# Patient Record
Sex: FEMALE | Race: ASIAN | Employment: UNEMPLOYED | URBAN - METROPOLITAN AREA
[De-identification: names, ages, dates, MRNs, and addresses within clinical notes are randomized per-mention and may not be internally consistent; named-entity substitution may affect disease eponyms.]

---

## 2024-09-11 NOTE — PROGRESS NOTES
Outpatient Maternal-Fetal Medicine Consultation    Dear Dr. Crawley    Thank you for requesting ultrasound evaluation and maternal fetal medicine consultation on your patient Carol Dias.  As you are aware she is a 28 year old female  with a singletonpregnancy and an Estimated Date of Delivery: 25..  A maternal-fetal medicine consultation was requested secondary to  FHx cleft lip/palate .  Her prenatal records and labs were reviewed.    HISTORY  # 1 - Date: None, Sex: None, Weight: None, GA: None, Type: None, Apgar1: None, Apgar5: None, Living: None, Birth Comments: None    Past Medical History  The patient  has no past medical history on file.    Past Surgical History  The patient  has no past surgical history on file.    Family History  The patient She indicated that her mother is alive. She indicated that her father is alive. She indicated that both of her sisters are alive. She indicated that her maternal grandmother is . She indicated that her maternal grandfather is . She indicated that her paternal grandmother is . She indicated that her paternal grandfather is . She indicated that her paternal aunt is . She indicated that her paternal uncle is alive.    Medications:   Current Outpatient Medications:     Prenatal Vit w/Qc-Kkcjucugn-WN (PNV OR), Take by mouth daily., Disp: , Rfl:     SRONYX 0.1-20 MG-MCG Oral Tab, TAKE 1 TABLET BY MOUTH EVERY DAY, Disp: 84 tablet, Rfl: 0    ciprofloxacin (CIPRO) 250 MG Oral Tab, Take 1 tablet (250 mg total) by mouth 2 (two) times daily. (Patient not taking: No sig reported), Disp: 6 tablet, Rfl: 0  Allergies: No Known Allergies    PHYSICAL EXAMINATION:  /87 (BP Location: Right arm, Patient Position: Sitting, Cuff Size: adult)   Pulse 77   Ht 5' 4\" (1.626 m)   Wt 168 lb (76.2 kg)   BMI 28.84 kg/m²   General: alert and oriented,no acute distress  Abdomen: gravid, soft, non-tender  Extremities: non-tender, no  edema    OBSTETRIC ULTRASOUND  The patient had a level II ultrasound today which revealed size consistent with dates and a normal detailed anatomic survey.      Ultrasound Findings:  Single IUP in cephalic presentation.    Placenta is posterior.   A 3 vessel cord, 3 vessel cord is noted.  Cardiac activity is present at 149 bpm   g ( 0 lb 12 oz);   MVP is 5.2 cm .     The fetal measurements are consistent with the established EDC. No ultrasound evidence of structural abnormalities are seen today. The nasal bone is present. No ultrasound evidence of markers for aneuploidy are seen. She understands that ultrasound exam cannot exclude genetic abnormalities and that genetic testing is recommended. The limitations of ultrasound were discussed.     Uterus and adnexa appeared normal  today on US    See PACS/Imaging Tab For Complete Ultrasound Report  I interpreted the results and reviewed them with the patient.    DISCUSSION  During her visit we discussed and reviewed the following issues:  FAMILY HISTORY CLEFT LIP  Sister-in-law with cleft lip/palate.    The incidence of facial clefts is 1:1000.  They occur twice as often in males.  Risk factors include AMA, anticonvulsants, pesticides, retinoids, alcohol and vitamin deficiencies.  Approximately 25% of clefts are associated with other anomalies and then chromosomal abnormalities may be as high as 35%; hence genetic testing is offered.  Isolated unilateral cleft lip is not associated with genetic abnormalities. Other syndromes (not associated with aneuploidy) are also seen with clefting; these may not be amenable to prenatal diagnosis although prenatal  microarray testing may improve the yield.  The immediate concerns after delivery are  airway management and feeding.  Prenatal consultation with neonatology, speech therapy and pediatric plastic surgeons is advised.  The empiric risk of recurrence is 4% but if related to an underlying  Syndrome the recurrence  risk may be elevated.     IMPRESSION:  IUP at 20w0d  Family History Cleft Lip/Palate: reassuring level II     RECOMMENDATIONS:  Continue care with Dr. Crawley  Routine antepartum care    Thank you for allowing me to participate in the care of your patient.  Please do not hesitate to contact me if additional questions or concerns arise.      Haim Silver M.D.    40 minutes spent in review of records, patient consultation, documentation and coordination of care.  The relevant clinical matter(s) are summarized above.     Note to patient and family  The 21st Century Cures Act makes medical notes available to patients in the interest of transparency.  However, please be advised that this is a medical document.  It is intended as axeo-yy-jvuk communication.  It is written and medical language may contain abbreviations or verbiage that are technical and unfamiliar.  It may appear blunt or direct.  Medical documents are intended to carry relevant information, facts as evident, and the clinical opinion of the practitioner.

## 2024-09-13 ENCOUNTER — OFFICE VISIT (OUTPATIENT)
Dept: PERINATAL CARE | Facility: HOSPITAL | Age: 29
End: 2024-09-13
Attending: OBSTETRICS & GYNECOLOGY
Payer: COMMERCIAL

## 2024-09-13 VITALS
WEIGHT: 168 LBS | SYSTOLIC BLOOD PRESSURE: 129 MMHG | BODY MASS INDEX: 28.68 KG/M2 | HEIGHT: 64 IN | DIASTOLIC BLOOD PRESSURE: 87 MMHG | HEART RATE: 77 BPM

## 2024-09-13 DIAGNOSIS — Z82.79 FAMILY HISTORY OF CLEFT LIP: ICD-10-CM

## 2024-09-13 DIAGNOSIS — Z82.79 FAMILY HISTORY OF CLEFT LIP: Primary | ICD-10-CM

## 2024-09-13 PROCEDURE — 76811 OB US DETAILED SNGL FETUS: CPT | Performed by: OBSTETRICS & GYNECOLOGY

## 2024-11-27 ENCOUNTER — HOSPITAL ENCOUNTER (OUTPATIENT)
Facility: HOSPITAL | Age: 29
Discharge: HOME | End: 2024-11-27
Attending: OBSTETRICS & GYNECOLOGY | Admitting: OBSTETRICS & GYNECOLOGY
Payer: COMMERCIAL

## 2024-11-27 VITALS — HEART RATE: 80 BPM | SYSTOLIC BLOOD PRESSURE: 119 MMHG | DIASTOLIC BLOOD PRESSURE: 74 MMHG

## 2024-11-27 PROCEDURE — 99213 OFFICE O/P EST LOW 20 MIN: CPT

## 2024-11-27 PROCEDURE — 59025 FETAL NON-STRESS TEST: CPT

## 2024-11-27 NOTE — PROGRESS NOTES
Updated  on NST reactive and BP WNL.  + fetal movement in triage per pt , order received to discharge pt home with kick count and labor instruction, Discharge instruction given, pt going home in stable condition, pt not in active labor on discharge, All pt belongings sent with pt on discharge.

## 2024-11-27 NOTE — PROGRESS NOTES
Pt is a 29 year old female admitted to [facility]/TR5-A.     Chief Complaint   Patient presents with    Decreased Fetal Movement      Pt is  30w5d intra-uterine pregnancy.  History obtained, consents signed. Oriented to room, staff, and plan of care.

## 2024-11-27 NOTE — DISCHARGE INSTRUCTIONS
Discharge Instructions    Diet: regular            General Instructions    Call your OB doctor if: Fluid leaking from your vagina;Uterine contractions increasing in intensity and frequency;Vaginal bleeding;Vaginal or rectal pressure;Temperature greater than 100F;Decrease in fetal movement;Uterine contractions 10 minutes or closer for 1 to 2 hours  Early labor comfort measures: Drink fluids and eat small light meals;Relax, sleep, take a warm bath or shower for 30 minutes or less;Take a walk

## 2024-11-27 NOTE — NST
Nonstress Test   Patient: Carol Dias    Gestation: 30w5d    NST:       Variability: Moderate           Accelerations: Yes           Decelerations: None            Baseline: 130 BPM           Uterine Irritability: No           Contractions: Irregular                                        Acoustic Stimulator: No           Nonstress Test Interpretation: Appropriate for gestational age           Nonstress Test Second Interpretation: Appropriate for gestational age                     Additional Comments

## 2025-01-02 ENCOUNTER — HOSPITAL ENCOUNTER (OUTPATIENT)
Facility: HOSPITAL | Age: 30
Discharge: ANOTHER HOSPITAL | End: 2025-01-02
Attending: OBSTETRICS & GYNECOLOGY | Admitting: OBSTETRICS & GYNECOLOGY
Payer: COMMERCIAL

## 2025-01-02 ENCOUNTER — HOSPITAL ENCOUNTER (EMERGENCY)
Facility: HOSPITAL | Age: 30
Discharge: HOME | End: 2025-01-03
Attending: EMERGENCY MEDICINE
Payer: COMMERCIAL

## 2025-01-02 VITALS
WEIGHT: 188 LBS | SYSTOLIC BLOOD PRESSURE: 118 MMHG | DIASTOLIC BLOOD PRESSURE: 81 MMHG | RESPIRATION RATE: 16 BRPM | OXYGEN SATURATION: 97 % | TEMPERATURE: 97 F | BODY MASS INDEX: 32 KG/M2 | HEART RATE: 68 BPM

## 2025-01-02 VITALS — HEART RATE: 67 BPM | SYSTOLIC BLOOD PRESSURE: 132 MMHG | DIASTOLIC BLOOD PRESSURE: 76 MMHG

## 2025-01-02 DIAGNOSIS — R42 VERTIGO: Primary | ICD-10-CM

## 2025-01-02 LAB
ALBUMIN SERPL-MCNC: 4.2 G/DL (ref 3.2–4.8)
ALBUMIN/GLOB SERPL: 1.5 {RATIO} (ref 1–2)
ALP LIVER SERPL-CCNC: 133 U/L
ALT SERPL-CCNC: 18 U/L
ANION GAP SERPL CALC-SCNC: 12 MMOL/L (ref 0–18)
AST SERPL-CCNC: 23 U/L (ref ?–34)
BASOPHILS # BLD AUTO: 0.03 X10(3) UL (ref 0–0.2)
BASOPHILS NFR BLD AUTO: 0.4 %
BILIRUB SERPL-MCNC: 0.7 MG/DL (ref 0.3–1.2)
BUN BLD-MCNC: <5 MG/DL (ref 9–23)
CALCIUM BLD-MCNC: 9.7 MG/DL (ref 8.7–10.4)
CHLORIDE SERPL-SCNC: 102 MMOL/L (ref 98–112)
CO2 SERPL-SCNC: 23 MMOL/L (ref 21–32)
CREAT BLD-MCNC: 0.51 MG/DL
EGFRCR SERPLBLD CKD-EPI 2021: 131 ML/MIN/1.73M2 (ref 60–?)
EOSINOPHIL # BLD AUTO: 0.04 X10(3) UL (ref 0–0.7)
EOSINOPHIL NFR BLD AUTO: 0.5 %
ERYTHROCYTE [DISTWIDTH] IN BLOOD BY AUTOMATED COUNT: 15.1 %
GLOBULIN PLAS-MCNC: 2.8 G/DL (ref 2–3.5)
GLUCOSE BLD-MCNC: 80 MG/DL (ref 70–99)
HCT VFR BLD AUTO: 38.1 %
HGB BLD-MCNC: 12.3 G/DL
IMM GRANULOCYTES # BLD AUTO: 0.06 X10(3) UL (ref 0–1)
IMM GRANULOCYTES NFR BLD: 0.8 %
LYMPHOCYTES # BLD AUTO: 2.05 X10(3) UL (ref 1–4)
LYMPHOCYTES NFR BLD AUTO: 26.3 %
MCH RBC QN AUTO: 21.5 PG (ref 26–34)
MCHC RBC AUTO-ENTMCNC: 32.3 G/DL (ref 31–37)
MCV RBC AUTO: 66.7 FL
MONOCYTES # BLD AUTO: 0.71 X10(3) UL (ref 0.1–1)
MONOCYTES NFR BLD AUTO: 9.1 %
NEUTROPHILS # BLD AUTO: 4.9 X10 (3) UL (ref 1.5–7.7)
NEUTROPHILS # BLD AUTO: 4.9 X10(3) UL (ref 1.5–7.7)
NEUTROPHILS NFR BLD AUTO: 62.9 %
PLATELET # BLD AUTO: 254 10(3)UL (ref 150–450)
POTASSIUM SERPL-SCNC: 4.4 MMOL/L (ref 3.5–5.1)
PROT SERPL-MCNC: 7 G/DL (ref 5.7–8.2)
RBC # BLD AUTO: 5.71 X10(6)UL
SODIUM SERPL-SCNC: 137 MMOL/L (ref 136–145)
WBC # BLD AUTO: 7.8 X10(3) UL (ref 4–11)

## 2025-01-02 PROCEDURE — 99284 EMERGENCY DEPT VISIT MOD MDM: CPT

## 2025-01-02 PROCEDURE — 85025 COMPLETE CBC W/AUTO DIFF WBC: CPT | Performed by: EMERGENCY MEDICINE

## 2025-01-02 PROCEDURE — 85025 COMPLETE CBC W/AUTO DIFF WBC: CPT

## 2025-01-02 PROCEDURE — 99212 OFFICE O/P EST SF 10 MIN: CPT

## 2025-01-02 PROCEDURE — 80053 COMPREHEN METABOLIC PANEL: CPT

## 2025-01-02 PROCEDURE — 80053 COMPREHEN METABOLIC PANEL: CPT | Performed by: EMERGENCY MEDICINE

## 2025-01-02 PROCEDURE — 36415 COLL VENOUS BLD VENIPUNCTURE: CPT

## 2025-01-02 PROCEDURE — 59025 FETAL NON-STRESS TEST: CPT

## 2025-01-03 ENCOUNTER — APPOINTMENT (OUTPATIENT)
Dept: MRI IMAGING | Facility: HOSPITAL | Age: 30
End: 2025-01-03
Attending: EMERGENCY MEDICINE
Payer: COMMERCIAL

## 2025-01-03 PROCEDURE — 70551 MRI BRAIN STEM W/O DYE: CPT | Performed by: EMERGENCY MEDICINE

## 2025-01-03 NOTE — ED PROVIDER NOTES
Patient Seen in: [facility]      History     Chief Complaint   Patient presents with    Dizziness     Stated Complaint: dizzy already cleared by OB.    Subjective:   HPI      Patient here for intermittent \"dizziness\".  This is further described as a vertiginous sensation.    Prior to being pregnant she had this intermittently and it was pretty clearly positional, it only last for several minutes.  When she became pregnant discontinued, only lasted for several minutes but was less positional.    Today it began in the morning when she was sitting at a computer looking straight and it persisted for several hours, has resolved after being evaluated in L&D.  It was not obviously positional.  There is no numbness or weakness.  There is no balance issues.  No vision issues.  She has not been having any headaches or infectious symptoms.    Able to ambulate that issue.  No neck pain.  States she has thalassemia but no anticoagulation issues.  No falls no trauma.            Objective:     History reviewed. No pertinent past medical history.           History reviewed. No pertinent surgical history.             Social History     Socioeconomic History    Marital status:    Tobacco Use    Smoking status: Never    Smokeless tobacco: Never   Substance and Sexual Activity    Alcohol use: No    Drug use: No                  Physical Exam     ED Triage Vitals [01/02/25 1954]   BP (!) 132/91   Pulse 89   Resp 16   Temp 98 °F (36.7 °C)   Temp src Temporal   SpO2 99 %   O2 Device None (Room air)       Current Vitals:   Vital Signs  BP: 118/81  Pulse: 68  Resp: 16  Temp: 97.4 °F (36.3 °C)  Temp src: Oral    Oxygen Therapy  SpO2: 97 %  O2 Device: None (Room air)        Physical Exam  Constitutional:  Appears well-developed and well-nourished.   Head: Normocephalic and atraumatic.   Nose: Nose normal.   Eyes: EOM are normal. Pupils are equal, round, and reactive to light.   Neck: Normal range of motion.  Neck supple. No JVD present.   Cardiovascular: Normal rate and regular rhythm.    Pulmonary/Chest: Effort normal and breath sounds normal. No stridor.   Abdominal: Soft. There is no tenderness. There is no guarding.   Musculoskeletal: Exhibits no edema or tenderness.   Neurological: Pt is alert and oriented to person, place, and time.     There is no nystagmus.  There are no cranial nerve deficits.  Speech is fluent and not slurred.  There is no word finding difficulty.     Strength is 5 out of 5 in the upper extremities bilaterally.  Sensation is symmetric in the upper extremities and not diminished.    Normal strength and sensation bilateral lower extremities.      no cerebellar   no meningeal signs    Skin: Skin is warm and dry.   Psychiatric: Normal mood and affect. Thought content normal.       ED Course     Labs Reviewed   COMP METABOLIC PANEL (14) - Abnormal; Notable for the following components:       Result Value    BUN <5 (*)     Creatinine 0.51 (*)     Alkaline Phosphatase 133 (*)     All other components within normal limits   CBC WITH DIFFERENTIAL WITH PLATELET - Abnormal; Notable for the following components:    RBC 5.71 (*)     MCV 66.7 (*)     MCH 21.5 (*)     All other components within normal limits   RAINBOW DRAW BLUE                   MDM          Differential diagnoses considered: Favoring peripheral vertigo but the possibly of a life-threatening intracranial hemorrhage, acute stroke or space-occupying lesion were also considered.  -No headache no visual symptoms, venous thrombosis less likely.    -For now we will obtain an MRI to look for stroke, bleed or space-occupying lesion.  If this is negative we will discharge her, have her continue meclizine follow-up with neurology as an outpatient.      0200  Chlamydia read from [facility] is negative on the MRI of the brain.  Discharge home with outpatient neurology follow-up    I visualized the radiology studies, my independent interpretation:  No large space-occupying lesion noted on MRI    *Discussion of ongoing management of this patient's care included: n/a  *Comorbidities contributing to the complexity of decision making: Pregnant status  *External charts reviewed: Hematology consult from earlier this year reviewed.  Patient saw Christina Nice from [facility].  Was noted to have minor iron deficiency but it was thought that her dizziness is unlikely to be a hematological issue.  *Additional sources of history: n/a    Shared decision making was done by: patient, myself.          Medical Decision Making      Disposition and Plan     Clinical Impression:  1. Vertigo         Disposition:  Discharge  1/3/2025  2:04 am    Follow-up:  Cristian Esquivel DO  [address] [address] [phone]    Follow up            Medications Prescribed:  Current Discharge Medication List              Supplementary Documentation:

## 2025-01-03 NOTE — NST
Nonstress Test   Patient: Carol Dias    Gestation: 35w6d    NST:       Variability: Moderate           Accelerations: Yes           Decelerations: None            Baseline: 125 BPM           Uterine Irritability: Yes           Contractions: Irregular                                        Acoustic Stimulator: No           Nonstress Test Interpretation: Reactive           Nonstress Test Second Interpretation: Reactive                     Additional Comments    Physician Evaluation      NST Interpretation: Reactive    Disposition:   Discharged    Comments:    To ED     Annita Zimmerman DO

## 2025-01-03 NOTE — PROGRESS NOTES
Updated  on pt BP , nst reactive and orders received to send pt to ER for further evaluation for dizzyness. ER charge RN updated pt cleared from OB , pt taken in wheelchair to ER for further evaluation, pt given discharge instructions on labor instruction , pt verbalizes understanding to the POC

## 2025-01-03 NOTE — PROGRESS NOTES
Pt is a 29 year old female admitted to TRG2/TRG2-A.     Chief Complaint   Patient presents with     Assessment     Dizzyness, pt say she usually gets on and off dizzyness but today ahe had prolonged dizzyness, no labor complaints       Pt is  35w6d intra-uterine pregnancy.  History obtained, consents signed. Oriented to room, staff, and plan of care.

## 2025-01-03 NOTE — ED INITIAL ASSESSMENT (HPI)
Pt arrives to ed w c/o dizziness since 1230. Pt reports being 26 weeks pregnant- cleared by OB. Pt reports this happening before but usually goes away and this time has not. Denies NV/CP/SOB

## 2025-01-15 ENCOUNTER — TELEPHONE (OUTPATIENT)
Dept: OBGYN UNIT | Facility: HOSPITAL | Age: 30
End: 2025-01-15

## 2025-01-26 ENCOUNTER — ANESTHESIA EVENT (OUTPATIENT)
Dept: OBGYN UNIT | Facility: HOSPITAL | Age: 30
End: 2025-01-26
Payer: COMMERCIAL

## 2025-01-26 ENCOUNTER — ANESTHESIA (OUTPATIENT)
Dept: OBGYN UNIT | Facility: HOSPITAL | Age: 30
End: 2025-01-26
Payer: COMMERCIAL

## 2025-01-26 ENCOUNTER — HOSPITAL ENCOUNTER (INPATIENT)
Facility: HOSPITAL | Age: 30
LOS: 4 days | Discharge: HOME | End: 2025-01-30
Attending: OBSTETRICS & GYNECOLOGY | Admitting: OBSTETRICS & GYNECOLOGY
Payer: COMMERCIAL

## 2025-01-26 DIAGNOSIS — Z48.89 ENCOUNTER FOR POSTOPERATIVE CARE: Primary | ICD-10-CM

## 2025-01-26 PROBLEM — Z34.90 PREGNANCY (HCC): Status: ACTIVE | Noted: 2025-01-26

## 2025-01-26 LAB
ANTIBODY SCREEN: NEGATIVE
BASOPHILS # BLD AUTO: 0.02 X10(3) UL (ref 0–0.2)
BASOPHILS NFR BLD AUTO: 0.2 %
EOSINOPHIL # BLD AUTO: 0.02 X10(3) UL (ref 0–0.7)
EOSINOPHIL NFR BLD AUTO: 0.2 %
ERYTHROCYTE [DISTWIDTH] IN BLOOD BY AUTOMATED COUNT: 16.9 %
HCT VFR BLD AUTO: 40.2 %
HGB BLD-MCNC: 12.9 G/DL
IMM GRANULOCYTES # BLD AUTO: 0.05 X10(3) UL (ref 0–1)
IMM GRANULOCYTES NFR BLD: 0.5 %
LYMPHOCYTES # BLD AUTO: 1.98 X10(3) UL (ref 1–4)
LYMPHOCYTES NFR BLD AUTO: 19.4 %
MCH RBC QN AUTO: 21.4 PG (ref 26–34)
MCHC RBC AUTO-ENTMCNC: 32.1 G/DL (ref 31–37)
MCV RBC AUTO: 66.7 FL
MONOCYTES # BLD AUTO: 0.88 X10(3) UL (ref 0.1–1)
MONOCYTES NFR BLD AUTO: 8.6 %
NEUTROPHILS # BLD AUTO: 7.24 X10 (3) UL (ref 1.5–7.7)
NEUTROPHILS # BLD AUTO: 7.24 X10(3) UL (ref 1.5–7.7)
NEUTROPHILS NFR BLD AUTO: 71.1 %
PLATELET # BLD AUTO: 258 10(3)UL (ref 150–450)
RBC # BLD AUTO: 6.03 X10(6)UL
RH BLOOD TYPE: POSITIVE
RH BLOOD TYPE: POSITIVE
T PALLIDUM AB SER QL IA: NONREACTIVE
WBC # BLD AUTO: 10.2 X10(3) UL (ref 4–11)

## 2025-01-26 PROCEDURE — 10907ZC DRAINAGE OF AMNIOTIC FLUID, THERAPEUTIC FROM PRODUCTS OF CONCEPTION, VIA NATURAL OR ARTIFICIAL OPENING: ICD-10-PCS | Performed by: OBSTETRICS & GYNECOLOGY

## 2025-01-26 RX ORDER — TERBUTALINE SULFATE 1 MG/ML
0.25 INJECTION, SOLUTION SUBCUTANEOUS AS NEEDED
Status: DISCONTINUED | OUTPATIENT
Start: 2025-01-26 | End: 2025-01-27 | Stop reason: HOSPADM

## 2025-01-26 RX ORDER — LIDOCAINE HYDROCHLORIDE 20 MG/ML
5 INJECTION, SOLUTION EPIDURAL; INFILTRATION; INTRACAUDAL; PERINEURAL AS NEEDED
Status: DISCONTINUED | OUTPATIENT
Start: 2025-01-26 | End: 2025-01-27

## 2025-01-26 RX ORDER — SODIUM CHLORIDE, SODIUM LACTATE, POTASSIUM CHLORIDE, CALCIUM CHLORIDE 600; 310; 30; 20 MG/100ML; MG/100ML; MG/100ML; MG/100ML
INJECTION, SOLUTION INTRAVENOUS CONTINUOUS
Status: DISCONTINUED | OUTPATIENT
Start: 2025-01-26 | End: 2025-01-27 | Stop reason: HOSPADM

## 2025-01-26 RX ORDER — BUPIVACAINE HCL/0.9 % NACL/PF 0.25 %
5 PLASTIC BAG, INJECTION (ML) EPIDURAL AS NEEDED
Status: DISCONTINUED | OUTPATIENT
Start: 2025-01-26 | End: 2025-01-26

## 2025-01-26 RX ORDER — BUPIVACAINE HYDROCHLORIDE 2.5 MG/ML
INJECTION, SOLUTION EPIDURAL; INFILTRATION; INTRACAUDAL AS NEEDED
Status: DISCONTINUED | OUTPATIENT
Start: 2025-01-26 | End: 2025-01-27 | Stop reason: SURG

## 2025-01-26 RX ORDER — ONDANSETRON 2 MG/ML
4 INJECTION INTRAMUSCULAR; INTRAVENOUS EVERY 6 HOURS PRN
Status: DISCONTINUED | OUTPATIENT
Start: 2025-01-26 | End: 2025-01-27 | Stop reason: HOSPADM

## 2025-01-26 RX ORDER — NALBUPHINE HYDROCHLORIDE 10 MG/ML
2.5 INJECTION INTRAMUSCULAR; INTRAVENOUS; SUBCUTANEOUS
Status: DISCONTINUED | OUTPATIENT
Start: 2025-01-26 | End: 2025-01-27

## 2025-01-26 RX ORDER — SODIUM CHLORIDE 9 MG/ML
INJECTION, SOLUTION INTRAMUSCULAR; INTRAVENOUS; SUBCUTANEOUS
Status: DISCONTINUED
Start: 2025-01-26 | End: 2025-01-26 | Stop reason: WASHOUT

## 2025-01-26 RX ORDER — BUPIVACAINE HYDROCHLORIDE 2.5 MG/ML
30 INJECTION, SOLUTION EPIDURAL; INFILTRATION; INTRACAUDAL AS NEEDED
Status: DISCONTINUED | OUTPATIENT
Start: 2025-01-26 | End: 2025-01-27

## 2025-01-26 RX ORDER — LIDOCAINE HYDROCHLORIDE AND EPINEPHRINE 15; 5 MG/ML; UG/ML
5 INJECTION, SOLUTION EPIDURAL AS NEEDED
Status: DISCONTINUED | OUTPATIENT
Start: 2025-01-26 | End: 2025-01-27

## 2025-01-26 RX ORDER — SODIUM CHLORIDE 9 MG/ML
10 INJECTION, SOLUTION INTRAMUSCULAR; INTRAVENOUS; SUBCUTANEOUS AS NEEDED
Status: DISCONTINUED | OUTPATIENT
Start: 2025-01-26 | End: 2025-01-26

## 2025-01-26 RX ORDER — BUPIVACAINE HCL/0.9 % NACL/PF 0.25 %
5 PLASTIC BAG, INJECTION (ML) EPIDURAL AS NEEDED
Status: DISCONTINUED | OUTPATIENT
Start: 2025-01-26 | End: 2025-01-27

## 2025-01-26 RX ORDER — CITRIC ACID/SODIUM CITRATE 334-500MG
30 SOLUTION, ORAL ORAL AS NEEDED
Status: DISCONTINUED | OUTPATIENT
Start: 2025-01-26 | End: 2025-01-27 | Stop reason: HOSPADM

## 2025-01-26 RX ORDER — BUPIVACAINE HCL/0.9 % NACL/PF 0.25 %
PLASTIC BAG, INJECTION (ML) EPIDURAL
Status: DISCONTINUED
Start: 2025-01-26 | End: 2025-01-26

## 2025-01-26 RX ORDER — NALBUPHINE HYDROCHLORIDE 10 MG/ML
2.5 INJECTION INTRAMUSCULAR; INTRAVENOUS; SUBCUTANEOUS
Status: DISCONTINUED | OUTPATIENT
Start: 2025-01-26 | End: 2025-01-26

## 2025-01-26 RX ORDER — LIDOCAINE HYDROCHLORIDE AND EPINEPHRINE 15; 5 MG/ML; UG/ML
5 INJECTION, SOLUTION EPIDURAL AS NEEDED
Status: DISCONTINUED | OUTPATIENT
Start: 2025-01-26 | End: 2025-01-26

## 2025-01-26 RX ORDER — DEXTROSE, SODIUM CHLORIDE, SODIUM LACTATE, POTASSIUM CHLORIDE, AND CALCIUM CHLORIDE 5; .6; .31; .03; .02 G/100ML; G/100ML; G/100ML; G/100ML; G/100ML
INJECTION, SOLUTION INTRAVENOUS AS NEEDED
Status: DISCONTINUED | OUTPATIENT
Start: 2025-01-26 | End: 2025-01-27 | Stop reason: HOSPADM

## 2025-01-26 RX ORDER — SODIUM CHLORIDE 9 MG/ML
10 INJECTION, SOLUTION INTRAMUSCULAR; INTRAVENOUS; SUBCUTANEOUS AS NEEDED
Status: COMPLETED | OUTPATIENT
Start: 2025-01-26 | End: 2025-01-26

## 2025-01-26 RX ORDER — LIDOCAINE HYDROCHLORIDE AND EPINEPHRINE 15; 5 MG/ML; UG/ML
INJECTION, SOLUTION EPIDURAL AS NEEDED
Status: DISCONTINUED | OUTPATIENT
Start: 2025-01-26 | End: 2025-01-27 | Stop reason: SURG

## 2025-01-26 RX ORDER — LIDOCAINE HYDROCHLORIDE 20 MG/ML
5 INJECTION, SOLUTION EPIDURAL; INFILTRATION; INTRACAUDAL; PERINEURAL AS NEEDED
Status: DISCONTINUED | OUTPATIENT
Start: 2025-01-26 | End: 2025-01-26

## 2025-01-26 RX ORDER — FERROUS SULFATE 325(65) MG
325 TABLET, DELAYED RELEASE (ENTERIC COATED) ORAL
COMMUNITY
End: 2025-01-30

## 2025-01-26 RX ORDER — BUPIVACAINE HYDROCHLORIDE 2.5 MG/ML
30 INJECTION, SOLUTION EPIDURAL; INFILTRATION; INTRACAUDAL AS NEEDED
Status: DISCONTINUED | OUTPATIENT
Start: 2025-01-26 | End: 2025-01-26

## 2025-01-26 RX ORDER — IBUPROFEN 600 MG/1
600 TABLET, FILM COATED ORAL ONCE AS NEEDED
Status: DISCONTINUED | OUTPATIENT
Start: 2025-01-26 | End: 2025-01-27 | Stop reason: HOSPADM

## 2025-01-26 RX ADMIN — LIDOCAINE HYDROCHLORIDE AND EPINEPHRINE 3 ML: 15; 5 INJECTION, SOLUTION EPIDURAL at 12:10:00

## 2025-01-26 RX ADMIN — BUPIVACAINE HYDROCHLORIDE 5 ML: 2.5 INJECTION, SOLUTION EPIDURAL; INFILTRATION; INTRACAUDAL at 12:15:00

## 2025-01-26 NOTE — PROGRESS NOTES
admitted to triage 4 with  present. Pt ambulated to room, to bathroom, gown on, pt to bed. Pt states ctxs began at 0130, denies lof or bleeding. Efm and toco applied. Initial assessment done.

## 2025-01-26 NOTE — H&P
[facility]  History & Physical    SUBJECTIVE:    Carol Dias is a 29 year old  at 39w2d who presents for labor mgt.  74%ile at 33+ wks     Obstetric History:    Past Medical History: History reviewed. No pertinent past medical history.  Past Surgical History:  History reviewed. No pertinent surgical history.  Family History:    Family History   Problem Relation Age of Onset    Diabetes Father     Hypertension Father     Cancer Paternal Aunt 45        Breast cancer    Diabetes Paternal Uncle      Social History:    Social History     Tobacco Use    Smoking status: Never    Smokeless tobacco: Never   Substance Use Topics    Alcohol use: No     Home Meds:   Medications Ordered Prior to Encounter[1]  Allergies:   Allergies[2]      OBJECTIVE:    Pulse: 95  BP: 131/86    abd gravid, NT  FHTs baseline 140s, moderate variability, accels appreciated, no decels  tocos Q2-4 minutes  SVE 4 cm/70%/-2 sta, cephalic    Lab Review:  GBS pos    ASSESSMENT:  39w2d  Labor  GBS pos    PLAN:  Admit  Antibiotics for GBS pos  Expectant mgt         [1]   No current facility-administered medications on file prior to encounter.     Current Outpatient Medications on File Prior to Encounter   Medication Sig Dispense Refill    ferrous sulfate 325 (65 FE) MG Oral Tab EC Take 1 tablet (325 mg total) by mouth daily with breakfast.      Prenatal Vit w/Ge-Raylbpnxr-PC (PNV OR) Take by mouth daily.      SRONYX 0.1-20 MG-MCG Oral Tab TAKE 1 TABLET BY MOUTH EVERY DAY 84 tablet 0    ciprofloxacin (CIPRO) 250 MG Oral Tab Take 1 tablet (250 mg total) by mouth 2 (two) times daily. (Patient not taking: No sig reported) 6 tablet 0   [2]   Allergies  Allergen Reactions    Acetaminophen Coughing and UNKNOWN

## 2025-01-26 NOTE — ANESTHESIA PROCEDURE NOTES
Labor Analgesia    Date/Time: 1/26/2025 11:56 AM    Performed by: Florencia Rincon MD  Authorized by: Florencia Rincon MD      General Information and Staff    Start Time:  1/26/2025 11:56 AM  End Time:  1/26/2025 12:10 PM  Anesthesiologist:  Florencia Rincon MD  Performed by:  Anesthesiologist  Patient Location:  OB  Site Identification: surface landmarks    Reason for Block: labor epidural    Preanesthetic Checklist: patient identified, IV checked, risks and benefits discussed, monitors and equipment checked, pre-op evaluation, timeout performed, IV bolus, anesthesia consent and sterile technique used      Procedure Details    Patient Position:  Sitting  Prep: ChloraPrep    Monitoring:  Heart rate and continuous pulse ox  Approach:  Midline    Epidural Needle    Injection Technique:   Needle Type:  Tuohy  Needle Gauge:  17 G  Needle Length:  3.375 in  Needle Insertion Depth:  6  Location:  L3-4    Spinal Needle      Catheter    Catheter Type:  End hole  Catheter Size:  19 G  Catheter at Skin Depth:  12  Test Dose:  Negative    Assessment    Sensory Level:  T10    Additional Comments

## 2025-01-26 NOTE — ANESTHESIA PREPROCEDURE EVALUATION
PRE-OP EVALUATION    Patient Name: Carol Dias    Admit Diagnosis: PREGNANCY  Pregnancy (HCC)    Pre-op Diagnosis: * No pre-op diagnosis entered *        Anesthesia Procedure: LABOR ANALGESIA    * No surgeons found in log *    Pre-op vitals reviewed.  Temp: 98.4 °F (36.9 °C)  Pulse: 75  Resp: 14  BP: 135/92  SpO2: 99 %  Body mass index is 33.64 kg/m².    Current medications reviewed.  Hospital Medications:   lactated ringers infusion   Intravenous Continuous    dextrose in lactated ringers 5% infusion   Intravenous PRN    lactated ringers IV bolus 500 mL  500 mL Intravenous PRN    ibuprofen (Motrin) tab 600 mg  600 mg Oral Once PRN    ondansetron (Zofran) 4 MG/2ML injection 4 mg  4 mg Intravenous Q6H PRN    oxyTOCIN in sodium chloride 0.9% (Pitocin) 30 Units/500mL infusion premix  62.5-900 chichi-units/min Intravenous Continuous    terbutaline (Brethine) 1 MG/ML injection 0.25 mg  0.25 mg Subcutaneous PRN    sodium citrate-citric acid (Bicitra) 500-334 MG/5ML oral solution 30 mL  30 mL Oral PRN    [COMPLETED] ampicillin (Omnipen) 2 g in sodium chloride 0.9% 100 mL IVPB-MBP  2 g Intravenous Once    Followed by    ampicillin (Omnipen) 1 g in sodium chloride 0.9% 100 mL IVPB-MBP  1 g Intravenous Q4H    lactated ringers IV bolus 1,000 mL  1,000 mL Intravenous Once    fentaNYL (Sublimaze) 50 mcg/mL injection 100 mcg  100 mcg Epidural Once    lidocaine 1.5%-EPINEPHrine 1:200,000 (Xylocaine-Epinephrine) injection  5 mL Injection PRN    bupivacaine PF (Marcaine) 0.25% injection  30 mL Injection PRN    lidocaine PF (Xylocaine-MPF) 2% injection  5 mL Injection PRN    sodium chloride 0.9% PF injection 10 mL  10 mL Injection PRN    ePHEDrine (PF) 25 MG/5 ML injection 5 mg  5 mg Intravenous PRN    nalbuphine (Nubain) 10 mg/mL injection 2.5 mg  2.5 mg Intravenous Q15 Min PRN    lactated ringers IV bolus 1,000 mL  1,000 mL Intravenous Once    fentaNYL-bupivacaine 2 mcg/mL-0.125% in sodium chloride 0.9% 100 mL EPIDURAL infusion  premix  12 mL/hr Epidural Continuous    sodium chloride 0.9% PF injection 10 mL  10 mL Injection PRN    fentaNYL-bupivacaine in sodium chloride 0.9% 2 mcg/mL-0.125% EPIDURAL infusion premix        sodium chloride 0.9% PF 0.9% injection        fentaNYL (Sublimaze) 50 mcg/mL injection        ePHEDrine (PF) 25 MG/5 ML injection        lidocaine 1.5%-EPINEPHrine 1:200,000 (Xylocaine-Epinephrine) injection   Epidural PRN    bupivacaine PF (Marcaine) 0.25% injection   Epidural PRN       Outpatient Medications:   Prescriptions Prior to Admission[1]    Allergies: Acetaminophen      Anesthesia Evaluation        Anesthetic Complications           GI/Hepatic/Renal    Negative GI/hepatic/renal ROS.                             Cardiovascular    Negative cardiovascular ROS.    Exercise tolerance: good     MET: >4    (+) obesity                                       Endo/Other    Negative endo/other ROS.                              Pulmonary    Negative pulmonary ROS.                       Neuro/Psych    Negative neuro/psych ROS.                                  History reviewed. No pertinent surgical history.  Social History     Socioeconomic History    Marital status:    Tobacco Use    Smoking status: Never    Smokeless tobacco: Never   Vaping Use    Vaping status: Never Used   Substance and Sexual Activity    Alcohol use: No    Drug use: No     History   Drug Use No     Available pre-op labs reviewed.  Lab Results   Component Value Date    WBC 10.2 01/26/2025    RBC 6.03 (H) 01/26/2025    HGB 12.9 01/26/2025    HCT 40.2 01/26/2025    MCV 66.7 (L) 01/26/2025    MCH 21.4 (L) 01/26/2025    MCHC 32.1 01/26/2025    RDW 16.9 01/26/2025    .0 01/26/2025     Lab Results   Component Value Date     01/02/2025    K 4.4 01/02/2025     01/02/2025    CO2 23.0 01/02/2025    BUN <5 (L) 01/02/2025    CREATSERUM 0.51 (L) 01/02/2025    GLU 80 01/02/2025    CA 9.7 01/02/2025            Airway      Mallampati: II  Mouth  opening: 3 FB  TM distance: 4 - 6 cm  Neck ROM: full Cardiovascular      Rhythm: regular  Rate: normal     Dental    Dentition appears grossly intact         Pulmonary      Breath sounds clear to auscultation bilaterally.               Other findings              ASA: 2   Plan: general  NPO status verified and patient meets guidelines.    Post-procedure pain management plan discussed with surgeon and patient.      Plan/risks discussed with: patient  Use of blood product(s) discussed with: patient    Consented to blood products.          Present on Admission:  **None**             [1]   Medications Prior to Admission   Medication Sig Dispense Refill Last Dose/Taking    ferrous sulfate 325 (65 FE) MG Oral Tab EC Take 1 tablet (325 mg total) by mouth daily with breakfast.   Past Week    Prenatal Vit w/Nd-Hqpwfkjvc-XP (PNV OR) Take by mouth daily.   1/25/2025    SRONYX 0.1-20 MG-MCG Oral Tab TAKE 1 TABLET BY MOUTH EVERY DAY 84 tablet 0     ciprofloxacin (CIPRO) 250 MG Oral Tab Take 1 tablet (250 mg total) by mouth 2 (two) times daily. (Patient not taking: No sig reported) 6 tablet 0

## 2025-01-27 PROCEDURE — 59514 CESAREAN DELIVERY ONLY: CPT | Performed by: OBSTETRICS & GYNECOLOGY

## 2025-01-27 RX ORDER — KETOROLAC TROMETHAMINE 30 MG/ML
INJECTION, SOLUTION INTRAMUSCULAR; INTRAVENOUS
Status: COMPLETED
Start: 2025-01-27 | End: 2025-01-27

## 2025-01-27 RX ORDER — MORPHINE SULFATE 0.5 MG/ML
4 INJECTION, SOLUTION EPIDURAL; INTRATHECAL; INTRAVENOUS ONCE
Status: DISCONTINUED | OUTPATIENT
Start: 2025-01-27 | End: 2025-01-27

## 2025-01-27 RX ORDER — MEPERIDINE HYDROCHLORIDE 25 MG/ML
12.5 INJECTION INTRAMUSCULAR; INTRAVENOUS; SUBCUTANEOUS ONCE AS NEEDED
Status: DISCONTINUED | OUTPATIENT
Start: 2025-01-27 | End: 2025-01-27 | Stop reason: HOSPADM

## 2025-01-27 RX ORDER — METOCLOPRAMIDE HYDROCHLORIDE 5 MG/ML
10 INJECTION INTRAMUSCULAR; INTRAVENOUS EVERY 6 HOURS PRN
Status: DISCONTINUED | OUTPATIENT
Start: 2025-01-27 | End: 2025-01-30

## 2025-01-27 RX ORDER — DEXTROSE, SODIUM CHLORIDE, SODIUM LACTATE, POTASSIUM CHLORIDE, AND CALCIUM CHLORIDE 5; .6; .31; .03; .02 G/100ML; G/100ML; G/100ML; G/100ML; G/100ML
INJECTION, SOLUTION INTRAVENOUS CONTINUOUS PRN
Status: DISCONTINUED | OUTPATIENT
Start: 2025-01-27 | End: 2025-01-30

## 2025-01-27 RX ORDER — 0.9 % SODIUM CHLORIDE 0.9 %
INTRAVENOUS SOLUTION INTRAVENOUS
Status: COMPLETED
Start: 2025-01-27 | End: 2025-01-27

## 2025-01-27 RX ORDER — BISACODYL 10 MG
10 SUPPOSITORY, RECTAL RECTAL ONCE AS NEEDED
Status: DISCONTINUED | OUTPATIENT
Start: 2025-01-27 | End: 2025-01-30

## 2025-01-27 RX ORDER — DIPHENHYDRAMINE HYDROCHLORIDE 50 MG/ML
12.5 INJECTION INTRAMUSCULAR; INTRAVENOUS EVERY 4 HOURS PRN
Status: DISCONTINUED | OUTPATIENT
Start: 2025-01-27 | End: 2025-01-30

## 2025-01-27 RX ORDER — KETOROLAC TROMETHAMINE 30 MG/ML
30 INJECTION, SOLUTION INTRAMUSCULAR; INTRAVENOUS ONCE
Status: COMPLETED | OUTPATIENT
Start: 2025-01-27 | End: 2025-01-27

## 2025-01-27 RX ORDER — SODIUM CHLORIDE, SODIUM LACTATE, POTASSIUM CHLORIDE, CALCIUM CHLORIDE 600; 310; 30; 20 MG/100ML; MG/100ML; MG/100ML; MG/100ML
INJECTION, SOLUTION INTRAVENOUS CONTINUOUS
Status: DISCONTINUED | OUTPATIENT
Start: 2025-01-27 | End: 2025-01-30

## 2025-01-27 RX ORDER — DIPHENHYDRAMINE HYDROCHLORIDE 50 MG/ML
25 INJECTION INTRAMUSCULAR; INTRAVENOUS ONCE AS NEEDED
Status: DISCONTINUED | OUTPATIENT
Start: 2025-01-27 | End: 2025-01-27 | Stop reason: HOSPADM

## 2025-01-27 RX ORDER — KETOROLAC TROMETHAMINE 30 MG/ML
30 INJECTION, SOLUTION INTRAMUSCULAR; INTRAVENOUS EVERY 6 HOURS
Status: DISPENSED | OUTPATIENT
Start: 2025-01-27 | End: 2025-01-28

## 2025-01-27 RX ORDER — ACETAMINOPHEN 500 MG
1000 TABLET ORAL EVERY 6 HOURS
Status: DISCONTINUED | OUTPATIENT
Start: 2025-01-27 | End: 2025-01-27

## 2025-01-27 RX ORDER — LIDOCAINE HCL/EPINEPHRINE/PF 2%-1:200K
VIAL (ML) INJECTION AS NEEDED
Status: DISCONTINUED | OUTPATIENT
Start: 2025-01-27 | End: 2025-01-27 | Stop reason: SURG

## 2025-01-27 RX ORDER — DOCUSATE SODIUM 100 MG/1
100 CAPSULE, LIQUID FILLED ORAL
Status: DISCONTINUED | OUTPATIENT
Start: 2025-01-27 | End: 2025-01-30

## 2025-01-27 RX ORDER — ONDANSETRON 2 MG/ML
4 INJECTION INTRAMUSCULAR; INTRAVENOUS EVERY 6 HOURS PRN
Status: DISCONTINUED | OUTPATIENT
Start: 2025-01-27 | End: 2025-01-30

## 2025-01-27 RX ORDER — DIPHENHYDRAMINE HCL 25 MG
25 CAPSULE ORAL EVERY 4 HOURS PRN
Status: DISCONTINUED | OUTPATIENT
Start: 2025-01-27 | End: 2025-01-30

## 2025-01-27 RX ORDER — IBUPROFEN 600 MG/1
600 TABLET, FILM COATED ORAL EVERY 6 HOURS
Status: DISCONTINUED | OUTPATIENT
Start: 2025-01-28 | End: 2025-01-30

## 2025-01-27 RX ORDER — KETOROLAC TROMETHAMINE 30 MG/ML
30 INJECTION, SOLUTION INTRAMUSCULAR; INTRAVENOUS EVERY 6 HOURS PRN
Status: ACTIVE | OUTPATIENT
Start: 2025-01-27 | End: 2025-01-29

## 2025-01-27 RX ORDER — GABAPENTIN 300 MG/1
300 CAPSULE ORAL EVERY 8 HOURS PRN
Status: DISCONTINUED | OUTPATIENT
Start: 2025-01-27 | End: 2025-01-30

## 2025-01-27 RX ORDER — NALBUPHINE HYDROCHLORIDE 10 MG/ML
2.5 INJECTION INTRAMUSCULAR; INTRAVENOUS; SUBCUTANEOUS EVERY 4 HOURS PRN
Status: DISCONTINUED | OUTPATIENT
Start: 2025-01-27 | End: 2025-01-30

## 2025-01-27 RX ORDER — HYDROMORPHONE HYDROCHLORIDE 1 MG/ML
0.4 INJECTION, SOLUTION INTRAMUSCULAR; INTRAVENOUS; SUBCUTANEOUS EVERY 2 HOUR PRN
Status: ACTIVE | OUTPATIENT
Start: 2025-01-27 | End: 2025-01-28

## 2025-01-27 RX ORDER — ONDANSETRON 2 MG/ML
4 INJECTION INTRAMUSCULAR; INTRAVENOUS ONCE AS NEEDED
Status: DISCONTINUED | OUTPATIENT
Start: 2025-01-27 | End: 2025-01-27 | Stop reason: HOSPADM

## 2025-01-27 RX ORDER — HYDROMORPHONE HYDROCHLORIDE 1 MG/ML
0.4 INJECTION, SOLUTION INTRAMUSCULAR; INTRAVENOUS; SUBCUTANEOUS EVERY 5 MIN PRN
Status: DISCONTINUED | OUTPATIENT
Start: 2025-01-27 | End: 2025-01-27 | Stop reason: HOSPADM

## 2025-01-27 RX ORDER — AMMONIA 15 % (W/V)
0.3 AMPUL (EA) INHALATION AS NEEDED
Status: DISCONTINUED | OUTPATIENT
Start: 2025-01-27 | End: 2025-01-30

## 2025-01-27 RX ORDER — METOCLOPRAMIDE HYDROCHLORIDE 5 MG/ML
INJECTION INTRAMUSCULAR; INTRAVENOUS AS NEEDED
Status: DISCONTINUED | OUTPATIENT
Start: 2025-01-27 | End: 2025-01-27 | Stop reason: SURG

## 2025-01-27 RX ORDER — HYDROMORPHONE HYDROCHLORIDE 1 MG/ML
0.2 INJECTION, SOLUTION INTRAMUSCULAR; INTRAVENOUS; SUBCUTANEOUS EVERY 5 MIN PRN
Status: DISCONTINUED | OUTPATIENT
Start: 2025-01-27 | End: 2025-01-27 | Stop reason: HOSPADM

## 2025-01-27 RX ORDER — SIMETHICONE 80 MG
80 TABLET,CHEWABLE ORAL 3 TIMES DAILY PRN
Status: DISCONTINUED | OUTPATIENT
Start: 2025-01-27 | End: 2025-01-30

## 2025-01-27 RX ORDER — NALBUPHINE HYDROCHLORIDE 10 MG/ML
2.5 INJECTION INTRAMUSCULAR; INTRAVENOUS; SUBCUTANEOUS
Status: DISCONTINUED | OUTPATIENT
Start: 2025-01-27 | End: 2025-01-27 | Stop reason: HOSPADM

## 2025-01-27 RX ORDER — MORPHINE SULFATE 2 MG/ML
INJECTION, SOLUTION INTRAMUSCULAR; INTRAVENOUS AS NEEDED
Status: DISCONTINUED | OUTPATIENT
Start: 2025-01-27 | End: 2025-01-27 | Stop reason: SURG

## 2025-01-27 RX ORDER — NALOXONE HYDROCHLORIDE 0.4 MG/ML
0.08 INJECTION, SOLUTION INTRAMUSCULAR; INTRAVENOUS; SUBCUTANEOUS
Status: ACTIVE | OUTPATIENT
Start: 2025-01-27 | End: 2025-01-28

## 2025-01-27 RX ADMIN — LIDOCAINE HCL/EPINEPHRINE/PF 10 ML: 2%-1:200K VIAL (ML) INJECTION at 07:21:00

## 2025-01-27 RX ADMIN — SODIUM CHLORIDE, SODIUM LACTATE, POTASSIUM CHLORIDE, CALCIUM CHLORIDE: 600; 310; 30; 20 INJECTION, SOLUTION INTRAVENOUS at 07:20:00

## 2025-01-27 RX ADMIN — METOCLOPRAMIDE HYDROCHLORIDE 10 MG: 5 INJECTION INTRAMUSCULAR; INTRAVENOUS at 07:25:00

## 2025-01-27 RX ADMIN — MORPHINE SULFATE 4 MG: 2 INJECTION, SOLUTION INTRAMUSCULAR; INTRAVENOUS at 07:56:00

## 2025-01-27 NOTE — PROGRESS NOTES
Labor Progress Note    No complaints.  Temp:  [98.3 °F (36.8 °C)-98.9 °F (37.2 °C)] 98.3 °F (36.8 °C)  Pulse:  [] 67  Resp:  [14-16] 16  BP: ()/(38-93) 126/75  SpO2:  [97 %-100 %] 98 %  FHT:  baseline 140s, moderate variability, accels appreciated, no decels  [place]:  contractions q2-4 minutes with pitocin at 14 mU/min  SVE: 9cm/100%/0sta, cephalic    ASSESSMENT:  39w2d  GBS pos  PLAN:  Continue present mgt

## 2025-01-27 NOTE — OPERATIVE REPORT
Carol Dias Patient Status:  Inpatient    1995 MRN [id number]   [facility] LABOR & DELIVERY Attending Bret Lamb MD   Hosp Day # 1 PCP None Pcp     Preop Dx:   arrest of descent  Postop Dx:  Same + persisted occiput posterior position  Procedure: Primary Low transverse  section   Surgeon: BENY Crespo M.D.  Assistant: DEISY Jean MD  Anesthesia:   Spinal    Indications:  This patient is a 29 year old y/o  woman with arrest of descent.  The procedures , risks and complications were discussed with the patient including but not limited to infection, hemorrhage, blood transfusion, bowel bladder and ureteral injury, DVT and anesthetic risks.  Her questions were answered.             Procedure:  The surgical assist, Dr. Jean, was an integral part of the procedure. They were necessary to provide a safe outcome and to aid in adequate hemostasis. The patient was prepped and draped in a sterile fashion after satisfactory epidural anesthesia was given.  A transverse skin incision was made with a scalpel and extended to the fascia.  The fascia was incised in the midline with a scalpel then the incision extended laterally with a romero scissors.  The fascia was dissected from the muscles both inferiorly and superiorly with sharp and blunt dissection.  The peritoneum was elevated in incised with a gabriel and extended superiorly and inferiorly with good visualization of the bladder , an Ludwig retractor was placed in the abdominal cavity and opened. A bladder flap created.  The uterus was incised partway through with a scalpel, entered bluntly with the tip of my finger and the uterine incision was extended bilaterally with cephalocaudal manual traction. Membranes were ruptured bluntly. The head was then delivered and the mouth and nose were suctioned with a bulb syringe.   The remainder of the baby was delivered easily and the cord clamped and cut, then the male infant taken to the warmer where  Neonatology was in attendance.  Cord blood was obtained. Cord gasses were not obtained. The placenta was delivered with fundal massage and was normal.  The uterus was left in situ.  The uterine cavity was cleaned and the incision closed in a running locking suture or number one chromic.  Additional sutures of 2-0 chromic were placed for hemostasis as needed.   The paracolic gutters were cleaned and the uterine incision inspected again for hemostasis. The dionicio retractor was removed.  After noting excellent subfascial hemostasis, the fascia was closed with a running suture of #1 Vicryl.  The sub Q was inspected, bleeders cauterized and it was closed with running suture of 2-0 plain suture. The skin closed with running  4-0 monocryl sub Q sutures and steri strips.  The patient tolerated the procedure well with an 800 cc EBL and went to recovery in good condition.    Juan Carlos Crespo MD  01/27/25

## 2025-01-27 NOTE — PROGRESS NOTES
Patient transferred to mother/baby room 2207 per cart in stable condition with baby and personal belongings.  Accompanied by  and staff.  Report given to mother/baby RN.

## 2025-01-27 NOTE — PROGRESS NOTES
Labor Progress Note    Uncomfortable with contractions  Temp:  [98.3 °F (36.8 °C)-99.2 °F (37.3 °C)] 99.1 °F (37.3 °C)  Pulse:  [] 87  Resp:  [14-16] 16  BP: ()/(38-93) 124/79  SpO2:  [97 %-100 %] 98 %  FHT:  baseline 130s, moderate variability, accels appreciated, occ variable  Shipman:  contractions q2-3 minutes with pitocin at 16 mU/min  SVE: 10cm/100%/0, +1sta, cephalic   Minimal descent since pushimg   Caput appreciated  ASSESSMENT:  39w3d  Arrest of descent  PLAN:  Recommend  section  Reviewed risks and benefits of primary  section including but not limited to bleeding, infection and injury to internal tissues and organs such as bowel, bladder and ureter.

## 2025-01-27 NOTE — ANESTHESIA POSTPROCEDURE EVALUATION
[facility]    Carol Dias Patient Status:  Inpatient   Age/Gender 29 year old female MRN [id number]   Location [facility] LABOR & DELIVERY Attending Bret Lamb MD   Hosp Day # 1 PCP None Pcp       Anesthesia Post-op Note     SECTION    Procedure Summary       Date: 25 Room / Location:  L+D OR  /  L+D OR    Anesthesia Start: 1156 Anesthesia Stop:     Procedure:  SECTION (Abdomen) Diagnosis: (same, delivered)    Surgeons: Juan Carlos Crespo MD Anesthesiologist: Juan Carlos Romo MD    Anesthesia Type: epidural ASA Status: 2            Anesthesia Type: epidural    Vitals Value Taken Time   /70 25 0805   Temp 98.1 25 0805   Pulse 95 25 0805   Resp 16 25 0805   SpO2 100 25 0805           Patient Location: Labor and Delivery    Anesthesia Type: epidural    Airway Patency: patent    Postop Pain Control: adequate    Mental Status: preanesthetic baseline    Nausea/Vomiting: none    Cardiopulmonary/Hydration status: stable euvolemic    Complications: no apparent anesthesia related complications    Postop vital signs: stable    Dental Exam: Unchanged from Preop    Patient to be discharged from PACU when criteria met.

## 2025-01-27 NOTE — PROGRESS NOTES
Labor Progress Note    No complaints. Intermittent pressure.  Temp:  [98.3 °F (36.8 °C)-98.9 °F (37.2 °C)] 98.9 °F (37.2 °C)  Pulse:  [] 75  Resp:  [14-16] 16  BP: ()/(38-93) 133/83  SpO2:  [97 %-100 %] 98 %  FHT:  baseline 130s, moderate variability, accels appreciated, no decels  Kramer:  contractions q2-4 minutes with pitocin at 10 mU/min  SVE: 7-8cm/90%/0, -1sta, cephalic    ASSESSMENT:  39w2d  Labor   GBS pos  PLAN:  Continue present mgt

## 2025-01-28 LAB
BASOPHILS # BLD AUTO: 0.04 X10(3) UL (ref 0–0.2)
BASOPHILS NFR BLD AUTO: 0.2 %
EOSINOPHIL # BLD AUTO: 0.1 X10(3) UL (ref 0–0.7)
EOSINOPHIL NFR BLD AUTO: 0.6 %
ERYTHROCYTE [DISTWIDTH] IN BLOOD BY AUTOMATED COUNT: 14.6 %
HCT VFR BLD AUTO: 32.2 %
HGB BLD-MCNC: 10.4 G/DL
IMM GRANULOCYTES # BLD AUTO: 0.11 X10(3) UL (ref 0–1)
IMM GRANULOCYTES NFR BLD: 0.7 %
LYMPHOCYTES # BLD AUTO: 2.03 X10(3) UL (ref 1–4)
LYMPHOCYTES NFR BLD AUTO: 12.1 %
MCH RBC QN AUTO: 21.9 PG (ref 26–34)
MCHC RBC AUTO-ENTMCNC: 32.3 G/DL (ref 31–37)
MCV RBC AUTO: 67.9 FL
MONOCYTES # BLD AUTO: 1.37 X10(3) UL (ref 0.1–1)
MONOCYTES NFR BLD AUTO: 8.1 %
NEUTROPHILS # BLD AUTO: 13.19 X10 (3) UL (ref 1.5–7.7)
NEUTROPHILS # BLD AUTO: 13.19 X10(3) UL (ref 1.5–7.7)
NEUTROPHILS NFR BLD AUTO: 78.3 %
PLATELET # BLD AUTO: 183 10(3)UL (ref 150–450)
RBC # BLD AUTO: 4.74 X10(6)UL
WBC # BLD AUTO: 16.8 X10(3) UL (ref 4–11)

## 2025-01-28 NOTE — PROGRESS NOTES
Postop Day 1    Pt without complaints.     Temp: 98.4 °F (36.9 °C)  Pulse: 75  Resp: 18  BP: 111/84    Intake/Output Summary (Last 24 hours) at 1/28/2025 0948  Last data filed at 1/28/2025 0833  Gross per 24 hour   Intake --   Output 2150 ml   Net -2150 ml     abd  soft, NT, ND, fundus firm below umbilicus, incision C/D/I  extr  trace edema, no calf tenderness  Recent Labs   Lab 01/28/25  0652   RBC 4.74   HGB 10.4*   HCT 32.2*   MCV 67.9*   MCH 21.9*   MCHC 32.3   RDW 14.6   NEPRELIM 13.19*   WBC 16.8*   .0       Impression: POD#1  Plan:  Ambulation encouraged.    Advance diet as tolerated.    Continue postpartum care.    Circumcision discussed. Risk/benefit/alt discussed.

## 2025-01-28 NOTE — PROGRESS NOTES
[facility] 2SW-J n    Carol Dias Patient Status:  Inpatient   Age/Gender 29 year old female MRN [id number]   Location [facility] 2SW-J Attending Bret Lamb MD   Hosp Day # 2 PCP None Pcp      Anesthesia Pain Progress Note    Anesthesia Technique:   Epidural Anesthesia     Pain Management Technique:  In addition to available oral supplemental and IV medications  Patient received neuraxial preservative free morphine for post procedural pain control.    Post Procedure Pain Quality:    Adequate    Pain Management Side Effects:  None     /84 (BP Location: Right arm)   Pulse 75   Temp 98.4 °F (36.9 °C) (Oral)   Resp 18   Ht 1.626 m (5' 4\")   Wt 88.9 kg (196 lb)   SpO2 95%   Breastfeeding Yes   BMI 33.64 kg/m²       Injection Site: Injection site is intact on inspection     Complications from Pain Management or Anesthesia:   None    All patient questions were answered.  Follow up pain management is separate from intraoperative anesthetic needs.  Pain care is transitioned to primary service, with management by oral medications.    Thank you for asking us to participate in the care of your patient.    Neal Matias DO, 25, 3:40 PM      Neal Matias DO, 25, 3:40 PM

## 2025-01-29 RX ORDER — CALCIUM CARBONATE 500 MG/1
500 TABLET, CHEWABLE ORAL 3 TIMES DAILY PRN
Status: DISCONTINUED | OUTPATIENT
Start: 2025-01-29 | End: 2025-01-30

## 2025-01-29 NOTE — PROGRESS NOTES
Postpartum Progress Note    SUBJECTIVE:    Post-op day #2 s/p primary  section    No overnight events.  No complaints.  Ambulating, tolerating PO, voiding, passing flatus.      OBJECTIVE:    Vital signs in last 24 hours:  Temp:  [97.9 °F (36.6 °C)-98.2 °F (36.8 °C)] 97.9 °F (36.6 °C)  Pulse:  [61-66] 66  Resp:  [18] 18  BP: (107-128)/(66-83) 128/83  Input/Output:    Intake/Output Summary (Last 24 hours) at 2025 1031  Last data filed at 2025 1207  Gross per 24 hour   Intake --   Output 1000 ml   Net -1000 ml       Gen: appears well, nad  Abd: soft, appropriate post-op tenderness, fundus firm below umbilicus  Inc: c/d/i with sutures/steris  Sheyla: minimal lochia  Ext: nontender, minimal edema    Recent Labs   Lab 25  0755 25  0652   RBC 6.03* 4.74   HGB 12.9 10.4*   HCT 40.2 32.2*   MCV 66.7* 67.9*   MCH 21.4* 21.9*   MCHC 32.1 32.3   RDW 16.9 14.6   NEPRELIM 7.24 13.19*   WBC 10.2 16.8*   .0 183.0         ASSESSMENT/PLAN:    POD #2 s/p primary  section  Recovering well  Continue current care  Anticipate discharge home tomorrow    Mylene Morrison MD  2025  10:31 AM

## 2025-01-30 VITALS
HEART RATE: 63 BPM | RESPIRATION RATE: 18 BRPM | DIASTOLIC BLOOD PRESSURE: 83 MMHG | HEIGHT: 64 IN | TEMPERATURE: 98 F | OXYGEN SATURATION: 95 % | SYSTOLIC BLOOD PRESSURE: 135 MMHG | WEIGHT: 196 LBS | BODY MASS INDEX: 33.46 KG/M2

## 2025-01-30 RX ORDER — LABETALOL 100 MG/1
100 TABLET, FILM COATED ORAL ONCE
Status: COMPLETED | OUTPATIENT
Start: 2025-01-30 | End: 2025-01-30

## 2025-01-30 RX ORDER — OXYCODONE HYDROCHLORIDE 5 MG/1
5 TABLET ORAL EVERY 4 HOURS PRN
Qty: 5 TABLET | Refills: 0 | Status: SHIPPED | OUTPATIENT
Start: 2025-01-30

## 2025-01-30 RX ORDER — LABETALOL 100 MG/1
300 TABLET, FILM COATED ORAL 2 TIMES DAILY
Qty: 30 TABLET | Refills: 0 | Status: SHIPPED | OUTPATIENT
Start: 2025-01-30

## 2025-01-30 RX ORDER — LABETALOL 300 MG/1
300 TABLET, FILM COATED ORAL 2 TIMES DAILY
Qty: 60 TABLET | Refills: 0 | Status: SHIPPED | OUTPATIENT
Start: 2025-01-30

## 2025-01-30 RX ORDER — GABAPENTIN 300 MG/1
300 CAPSULE ORAL EVERY 8 HOURS PRN
Qty: 10 CAPSULE | Refills: 0 | Status: SHIPPED | OUTPATIENT
Start: 2025-01-30

## 2025-01-30 RX ORDER — LABETALOL 200 MG/1
200 TABLET, FILM COATED ORAL EVERY 12 HOURS SCHEDULED
Qty: 60 TABLET | Refills: 0 | Status: SHIPPED | OUTPATIENT
Start: 2025-01-30 | End: 2025-01-30

## 2025-01-30 RX ORDER — LABETALOL 200 MG/1
200 TABLET, FILM COATED ORAL EVERY 12 HOURS SCHEDULED
Status: DISCONTINUED | OUTPATIENT
Start: 2025-01-30 | End: 2025-01-30

## 2025-01-30 NOTE — DISCHARGE SUMMARY
[facility] Discharge Summary    Date of admission:  2025  5:58 AM  Date of discharge:  2025  Admit diagnosis:  39w3d      Labor      Arrest of descent  Discharge diagnosis: Same     Status post  section  Hospital course:     Carol Dias is a 29 year old  at 39w3d, who presents for labor mgt.  She is now status post uncomplicated primary  section for arrest of descent. Hospital course/postpartum recovery was significant for elevated BP on POD3. She was started on labetalol.  Disposition:  Home  Condition at discharge: Stable, ambulatory  Activity:   Restricted, pelvic rest  Diet:   General  Discharge meds: Labetalol 200 mg po bid  Motrin prn  Tylenol prn  Gabapentin prn  Oxycodone 5 mg po q6h prn  Follow-up with MD: 3-5d for BP check  2 and 6 wks

## 2025-01-30 NOTE — PROGRESS NOTES
Postop Day 3    Pt without complaints.     Temp:  [97.7 °F (36.5 °C)-97.9 °F (36.6 °C)] 97.7 °F (36.5 °C)  Pulse:  [61-66] 61  Resp:  [18] 18  BP: (122-140)/(73-90) 122/90  .Patient Vitals for the past 24 hrs:   BP Temp Temp src Pulse Resp   01/30/25 0945 132/86 -- -- 71 --   01/30/25 0755 (!) 145/95 97.7 °F (36.5 °C) Oral 60 18   01/29/25 2100 122/90 -- -- 61 --   01/29/25 2004 140/73 97.7 °F (36.5 °C) Oral 65 18   /100  abd  soft, NT, ND, fundus firm below umbilicus, incision C/D/I  extr  trace edema, no calf tenderness    Recent Labs   Lab 01/26/25  0755 01/28/25  0652   RBC 6.03* 4.74   HGB 12.9 10.4*   HCT 40.2 32.2*   MCV 66.7* 67.9*   MCH 21.4* 21.9*   MCHC 32.1 32.3   RDW 16.9 14.6   NEPRELIM 7.24 13.19*   WBC 10.2 16.8*   .0 183.0     Impression: POD#3  Plan:  Labetalol 200 mg po bid  Discharge instructions reviewed.    Home today.    Follow-up at 3-5d for BP check.

## 2025-02-01 ENCOUNTER — TELEPHONE (OUTPATIENT)
Dept: OBGYN UNIT | Facility: HOSPITAL | Age: 30
End: 2025-02-01

## 2025-02-01 NOTE — PROGRESS NOTES
Reviewed self and infant care w / mom, she verbalizes understanding of instructions reviewed. Encourage to follow up w/ MDs as directed and w/ questions/concerns. Has bp appt on Monday, sx of PIYUMI reviewed, doing well, enc to join ct

## 2025-02-04 PROBLEM — Z48.89 ENCOUNTER FOR POSTOPERATIVE CARE: Status: ACTIVE | Noted: 2025-02-04

## (undated) DEVICE — LARGE, DISPOSABLE ALEXIS O C-SECTION PROTECTOR - RETRACTOR: Brand: ALEXIS ® O C-SECTION PROTECTOR - RETRACTOR

## (undated) NOTE — LETTER
2024      Lesley Crawley MD  [address]  [address]  [facility] [address]  Via Outside Provider Messaging  Patient: Carol Dias  : 1995    Dear Colleague:  Thank you for referring your patient to me for a Maternal Fetal Medicine evaluation. Please see my attached note for my findings and recommendations.      Should you have any questions or concerns, please do not hesitate to contact me at the number listed below.    Best Regards,      Haim Silver MD  [facility]   [address]  [facility] [address]  [phone]    cc: No Recipients      [facility] Department of Maternal Fetal Medicine  Patient Name: Carol Dias  Patient : 1995  Physician: Haim Silver MD    Outpatient Maternal-Fetal Medicine Consultation    Dear Dr. Crawley    Thank you for requesting ultrasound evaluation and maternal fetal medicine consultation on your patient Carol Dias.  As you are aware she is a 28 year old female  with a singletonpregnancy and an Estimated Date of Delivery: 25..  A maternal-fetal medicine consultation was requested secondary to  FHx cleft lip/palate .  Her prenatal records and labs were reviewed.    HISTORY  # 1 - Date: None, Sex: None, Weight: None, GA: None, Type: None, Apgar1: None, Apgar5: None, Living: None, Birth Comments: None    Past Medical History  The patient  has no past medical history on file.    Past Surgical History  The patient  has no past surgical history on file.    Family History  The patient She indicated that her mother is alive. She indicated that her father is alive. She indicated that both of her sisters are alive. She indicated that her maternal grandmother is . She indicated that her maternal grandfather is . She indicated that her paternal grandmother is . She indicated that her paternal grandfather is . She indicated that her paternal aunt is . She indicated  that her paternal uncle is alive.    Medications:   Current Outpatient Medications:   •  Prenatal Vit w/Ro-Argjztakp-NU (PNV OR), Take by mouth daily., Disp: , Rfl:   •  SRONYX 0.1-20 MG-MCG Oral Tab, TAKE 1 TABLET BY MOUTH EVERY DAY, Disp: 84 tablet, Rfl: 0  •  ciprofloxacin (CIPRO) 250 MG Oral Tab, Take 1 tablet (250 mg total) by mouth 2 (two) times daily. (Patient not taking: No sig reported), Disp: 6 tablet, Rfl: 0  Allergies: No Known Allergies    PHYSICAL EXAMINATION:  /87 (BP Location: Right arm, Patient Position: Sitting, Cuff Size: adult)   Pulse 77   Ht 5' 4\" (1.626 m)   Wt 168 lb (76.2 kg)   BMI 28.84 kg/m²   General: alert and oriented,no acute distress  Abdomen: gravid, soft, non-tender  Extremities: non-tender, no edema    OBSTETRIC ULTRASOUND  The patient had a level II ultrasound today which revealed size consistent with dates and a normal detailed anatomic survey.      Ultrasound Findings:  Single IUP in cephalic presentation.    Placenta is posterior.   A 3 vessel cord, 3 vessel cord is noted.  Cardiac activity is present at 149 bpm   g ( 0 lb 12 oz);   MVP is 5.2 cm .     The fetal measurements are consistent with the established EDC. No ultrasound evidence of structural abnormalities are seen today. The nasal bone is present. No ultrasound evidence of markers for aneuploidy are seen. She understands that ultrasound exam cannot exclude genetic abnormalities and that genetic testing is recommended. The limitations of ultrasound were discussed.     Uterus and adnexa appeared normal  today on US    See PACS/Imaging Tab For Complete Ultrasound Report  I interpreted the results and reviewed them with the patient.    DISCUSSION  During her visit we discussed and reviewed the following issues:  FAMILY HISTORY CLEFT LIP  Sister-in-law with cleft lip/palate.    The incidence of facial clefts is 1:1000.  They occur twice as often in males.  Risk factors include AMA, anticonvulsants,  pesticides, retinoids, alcohol and vitamin deficiencies.  Approximately 25% of clefts are associated with other anomalies and then chromosomal abnormalities may be as high as 35%; hence genetic testing is offered.  Isolated unilateral cleft lip is not associated with genetic abnormalities. Other syndromes (not associated with aneuploidy) are also seen with clefting; these may not be amenable to prenatal diagnosis although prenatal  microarray testing may improve the yield.  The immediate concerns after delivery are  airway management and feeding.  Prenatal consultation with neonatology, speech therapy and pediatric plastic surgeons is advised.  The empiric risk of recurrence is 4% but if related to an underlying  Syndrome the recurrence risk may be elevated.     IMPRESSION:  IUP at 20w0d  Family History Cleft Lip/Palate: reassuring level II     RECOMMENDATIONS:  Continue care with Dr. Crawley  Routine antepartum care    Thank you for allowing me to participate in the care of your patient.  Please do not hesitate to contact me if additional questions or concerns arise.      Haim Silver M.D.    40 minutes spent in review of records, patient consultation, documentation and coordination of care.  The relevant clinical matter(s) are summarized above.     Note to patient and family  The 21st Century Cures Act makes medical notes available to patients in the interest of transparency.  However, please be advised that this is a medical document.  It is intended as sizo-[id number] communication.  It is written and medical language may contain abbreviations or verbiage that are technical and unfamiliar.  It may appear blunt or direct.  Medical documents are intended to carry relevant information, facts as evident, and the clinical opinion of the practitioner.    Pt here for Level II Ultrasound  +fm noted per patient  Pt denies complaints.